# Patient Record
Sex: MALE | Race: BLACK OR AFRICAN AMERICAN
[De-identification: names, ages, dates, MRNs, and addresses within clinical notes are randomized per-mention and may not be internally consistent; named-entity substitution may affect disease eponyms.]

---

## 2023-04-26 PROBLEM — Z00.00 ENCOUNTER FOR PREVENTIVE HEALTH EXAMINATION: Status: ACTIVE | Noted: 2023-04-26

## 2023-04-27 ENCOUNTER — APPOINTMENT (OUTPATIENT)
Dept: ORTHOPEDIC SURGERY | Facility: CLINIC | Age: 33
End: 2023-04-27
Payer: MEDICARE

## 2023-04-27 VITALS
OXYGEN SATURATION: 98 % | HEIGHT: 71 IN | DIASTOLIC BLOOD PRESSURE: 67 MMHG | HEART RATE: 69 BPM | BODY MASS INDEX: 28.7 KG/M2 | WEIGHT: 205 LBS | TEMPERATURE: 97.9 F | SYSTOLIC BLOOD PRESSURE: 112 MMHG

## 2023-04-27 DIAGNOSIS — M25.551 PAIN IN RIGHT HIP: ICD-10-CM

## 2023-04-27 DIAGNOSIS — Z87.81 PERSONAL HISTORY OF (HEALED) TRAUMATIC FRACTURE: ICD-10-CM

## 2023-04-27 PROCEDURE — 72100 X-RAY EXAM L-S SPINE 2/3 VWS: CPT

## 2023-04-27 PROCEDURE — 99203 OFFICE O/P NEW LOW 30 MIN: CPT

## 2023-04-27 PROCEDURE — 73502 X-RAY EXAM HIP UNI 2-3 VIEWS: CPT
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
## 2023-04-27 NOTE — DISCUSSION/SUMMARY
[de-identified] : The patient had a pelvic fracture with operative fixation 3 years ago.  He has had persistent pain.  He has been told by the treating physicians that he has nerve damage as a result of the injury.  He is being treated by pain management.  I find that there has been no new fracture or dislocation to his spine, pelvis or hip.  I have nothing additional to add to his care beyond what pain management is providing.  He will return as needed.

## 2023-04-27 NOTE — PHYSICAL EXAM
[Slightly Antalgic] : slightly antalgic [DP] : dorsalis pedis 2+ and symmetric bilaterally [PT] : posterior tibial 2+ and symmetric bilaterally [Normal] : Alert and in no acute distress [Poor Appearance] : well-appearing [Acute Distress] : not in acute distress [Obese] : not obese [de-identified] : The patient has no respiratory distress. Mood and affect are normal. The patient is alert and oriented to person, place and time.\par Examination of the lumbar spine demonstrates tenderness to the right and left of the midline.  He has lumbar spine flexion of 60 degrees limited by pain.  He has right and left lateral flexion of 10 degrees.  He has mild pain with passive rotation of the right hip.  Pain radiates to the groin on the right and the left.  There is no pain with knee range of motion.  The calves are soft and nontender.  He has decreased sensation on the lateral aspect of the right leg and dorsum of the right foot.  Motor function is 5/5 in all groups.  Deep tendon reflexes are 2+ and equal at the knees and at the ankles. [de-identified] : AP and lateral x-rays of the lumbar spine demonstrate no fracture or dislocation.  There is evidence of fixation devices from prior pelvic fracture\par AP and lateral x-rays of the right hip with AP of the pelvis demonstrates no acute fracture or dislocation.  The patient had prior surgery with fixation of the symphysis pubis and the left sacroiliac joint.